# Patient Record
Sex: MALE | Race: WHITE | Employment: OTHER | URBAN - METROPOLITAN AREA
[De-identification: names, ages, dates, MRNs, and addresses within clinical notes are randomized per-mention and may not be internally consistent; named-entity substitution may affect disease eponyms.]

---

## 2018-01-22 ENCOUNTER — OFFICE VISIT CONVERTED (OUTPATIENT)
Dept: FAMILY MEDICINE CLINIC | Facility: CLINIC | Age: 68
End: 2018-01-22
Attending: NURSE PRACTITIONER

## 2018-02-21 ENCOUNTER — OFFICE VISIT CONVERTED (OUTPATIENT)
Dept: FAMILY MEDICINE CLINIC | Facility: CLINIC | Age: 68
End: 2018-02-21
Attending: NURSE PRACTITIONER

## 2018-03-01 ENCOUNTER — OFFICE VISIT CONVERTED (OUTPATIENT)
Dept: PULMONOLOGY | Facility: CLINIC | Age: 68
End: 2018-03-01
Attending: INTERNAL MEDICINE

## 2018-05-14 ENCOUNTER — CONVERSION ENCOUNTER (OUTPATIENT)
Dept: FAMILY MEDICINE CLINIC | Facility: CLINIC | Age: 68
End: 2018-05-14

## 2018-05-14 ENCOUNTER — OFFICE VISIT CONVERTED (OUTPATIENT)
Dept: FAMILY MEDICINE CLINIC | Facility: CLINIC | Age: 68
End: 2018-05-14
Attending: NURSE PRACTITIONER

## 2018-05-15 ENCOUNTER — OFFICE VISIT CONVERTED (OUTPATIENT)
Dept: PULMONOLOGY | Facility: CLINIC | Age: 68
End: 2018-05-15
Attending: INTERNAL MEDICINE

## 2018-08-15 ENCOUNTER — CONVERSION ENCOUNTER (OUTPATIENT)
Dept: FAMILY MEDICINE CLINIC | Facility: CLINIC | Age: 68
End: 2018-08-15

## 2018-08-15 ENCOUNTER — OFFICE VISIT CONVERTED (OUTPATIENT)
Dept: FAMILY MEDICINE CLINIC | Facility: CLINIC | Age: 68
End: 2018-08-15
Attending: NURSE PRACTITIONER

## 2018-11-14 ENCOUNTER — OFFICE VISIT CONVERTED (OUTPATIENT)
Dept: FAMILY MEDICINE CLINIC | Facility: CLINIC | Age: 68
End: 2018-11-14
Attending: NURSE PRACTITIONER

## 2019-03-25 ENCOUNTER — CONVERSION ENCOUNTER (OUTPATIENT)
Dept: FAMILY MEDICINE CLINIC | Facility: CLINIC | Age: 69
End: 2019-03-25

## 2019-03-25 ENCOUNTER — OFFICE VISIT CONVERTED (OUTPATIENT)
Dept: FAMILY MEDICINE CLINIC | Facility: CLINIC | Age: 69
End: 2019-03-25
Attending: NURSE PRACTITIONER

## 2019-05-14 ENCOUNTER — OFFICE VISIT CONVERTED (OUTPATIENT)
Dept: FAMILY MEDICINE CLINIC | Facility: CLINIC | Age: 69
End: 2019-05-14
Attending: NURSE PRACTITIONER

## 2019-11-08 ENCOUNTER — OFFICE VISIT CONVERTED (OUTPATIENT)
Dept: FAMILY MEDICINE CLINIC | Facility: CLINIC | Age: 69
End: 2019-11-08
Attending: NURSE PRACTITIONER

## 2020-03-02 ENCOUNTER — HOSPITAL ENCOUNTER (OUTPATIENT)
Dept: GENERAL RADIOLOGY | Facility: HOSPITAL | Age: 70
Discharge: HOME | End: 2020-03-02
Attending: NURSE PRACTITIONER

## 2020-03-02 ENCOUNTER — OFFICE VISIT CONVERTED (OUTPATIENT)
Dept: FAMILY MEDICINE CLINIC | Facility: CLINIC | Age: 70
End: 2020-03-02
Attending: NURSE PRACTITIONER

## 2020-05-13 ENCOUNTER — HOSPITAL ENCOUNTER (OUTPATIENT)
Dept: OTHER | Facility: HOSPITAL | Age: 70
Discharge: HOME | End: 2020-05-13
Attending: NURSE PRACTITIONER

## 2020-05-13 ENCOUNTER — CONVERSION ENCOUNTER (OUTPATIENT)
Dept: OTHER | Facility: HOSPITAL | Age: 70
End: 2020-05-13

## 2020-05-13 ENCOUNTER — CONVERSION ENCOUNTER (OUTPATIENT)
Dept: FAMILY MEDICINE CLINIC | Facility: CLINIC | Age: 70
End: 2020-05-13

## 2020-05-13 ENCOUNTER — OFFICE VISIT CONVERTED (OUTPATIENT)
Dept: FAMILY MEDICINE CLINIC | Facility: CLINIC | Age: 70
End: 2020-05-13
Attending: NURSE PRACTITIONER

## 2020-05-15 LAB — SARS-COV-2 RNA SPEC QL NAA+PROBE: NOT DETECTED

## 2020-05-22 ENCOUNTER — HOSPITAL ENCOUNTER (OUTPATIENT)
Dept: LAB | Facility: HOSPITAL | Age: 70
Discharge: HOME | End: 2020-05-22
Attending: NURSE PRACTITIONER

## 2020-05-22 LAB
ALBUMIN SERPL-MCNC: 4.7 G/DL (ref 3.5–5)
ALBUMIN/GLOB SERPL: 1.7 {RATIO} (ref 1.4–2.6)
ALP SERPL-CCNC: 68 U/L (ref 56–155)
ALT SERPL-CCNC: 32 U/L (ref 10–40)
ANION GAP SERPL CALC-SCNC: 21 MMOL/L (ref 8–19)
APPEARANCE UR: CLEAR
AST SERPL-CCNC: 23 U/L (ref 15–50)
BILIRUB SERPL-MCNC: 0.4 MG/DL (ref 0.2–1.3)
BILIRUB UR QL: NEGATIVE
BUN SERPL-MCNC: 28 MG/DL (ref 5–25)
BUN/CREAT SERPL: 18 {RATIO} (ref 6–20)
CALCIUM SERPL-MCNC: 9.3 MG/DL (ref 8.7–10.4)
CHLORIDE SERPL-SCNC: 101 MMOL/L (ref 99–111)
CHOLEST SERPL-MCNC: 134 MG/DL (ref 107–200)
CHOLEST/HDLC SERPL: 4.8 {RATIO} (ref 3–6)
COLOR UR: ABNORMAL
CONV CO2: 25 MMOL/L (ref 22–32)
CONV COLLECTION SOURCE (UA): ABNORMAL
CONV CREATININE URINE, RANDOM: 75.4 MG/DL (ref 10–300)
CONV MICROALBUM.,U,RANDOM: <12 MG/L (ref 0–20)
CONV TOTAL PROTEIN: 7.4 G/DL (ref 6.3–8.2)
CONV UROBILINOGEN IN URINE BY AUTOMATED TEST STRIP: 0.2 {EHRLICHU}/DL (ref 0.1–1)
CREAT UR-MCNC: 1.58 MG/DL (ref 0.7–1.2)
EST. AVERAGE GLUCOSE BLD GHB EST-MCNC: 163 MG/DL
GFR SERPLBLD BASED ON 1.73 SQ M-ARVRAT: 43 ML/MIN/{1.73_M2}
GLOBULIN UR ELPH-MCNC: 2.7 G/DL (ref 2–3.5)
GLUCOSE SERPL-MCNC: 93 MG/DL (ref 70–99)
GLUCOSE UR QL: >=1000 MG/DL
HBA1C MFR BLD: 7.3 % (ref 3.5–5.7)
HDLC SERPL-MCNC: 28 MG/DL (ref 40–60)
HGB UR QL STRIP: NEGATIVE
KETONES UR QL STRIP: NEGATIVE MG/DL
LDLC SERPL CALC-MCNC: 37 MG/DL (ref 70–100)
LEUKOCYTE ESTERASE UR QL STRIP: NEGATIVE
MICROALBUMIN/CREAT UR: 15.9 MG/G{CRE} (ref 0–25)
NITRITE UR QL STRIP: NEGATIVE
OSMOLALITY SERPL CALC.SUM OF ELEC: 299 MOSM/KG (ref 273–304)
PH UR STRIP.AUTO: 5 [PH] (ref 5–8)
POTASSIUM SERPL-SCNC: 5.4 MMOL/L (ref 3.5–5.3)
PROT UR QL: NEGATIVE MG/DL
PSA SERPL-MCNC: 2.66 NG/ML (ref 0–4)
SODIUM SERPL-SCNC: 142 MMOL/L (ref 135–147)
SP GR UR: 1.02 (ref 1–1.03)
TRIGL SERPL-MCNC: 344 MG/DL (ref 40–150)
VLDLC SERPL-MCNC: 69 MG/DL (ref 5–37)

## 2020-06-12 ENCOUNTER — HOSPITAL ENCOUNTER (OUTPATIENT)
Dept: LAB | Facility: HOSPITAL | Age: 70
Discharge: HOME | End: 2020-06-12
Attending: NURSE PRACTITIONER

## 2020-06-12 LAB
ALBUMIN SERPL-MCNC: 4.5 G/DL (ref 3.5–5)
ALBUMIN/GLOB SERPL: 1.7 {RATIO} (ref 1.4–2.6)
ALP SERPL-CCNC: 76 U/L (ref 56–155)
ALT SERPL-CCNC: 21 U/L (ref 10–40)
ANION GAP SERPL CALC-SCNC: 15 MMOL/L (ref 8–19)
AST SERPL-CCNC: 18 U/L (ref 15–50)
BILIRUB SERPL-MCNC: 0.47 MG/DL (ref 0.2–1.3)
BUN SERPL-MCNC: 32 MG/DL (ref 5–25)
BUN/CREAT SERPL: 24 {RATIO} (ref 6–20)
CALCIUM SERPL-MCNC: 8.7 MG/DL (ref 8.7–10.4)
CHLORIDE SERPL-SCNC: 104 MMOL/L (ref 99–111)
CONV CO2: 28 MMOL/L (ref 22–32)
CONV TOTAL PROTEIN: 7.1 G/DL (ref 6.3–8.2)
CREAT UR-MCNC: 1.34 MG/DL (ref 0.7–1.2)
GFR SERPLBLD BASED ON 1.73 SQ M-ARVRAT: 53 ML/MIN/{1.73_M2}
GLOBULIN UR ELPH-MCNC: 2.6 G/DL (ref 2–3.5)
GLUCOSE SERPL-MCNC: 109 MG/DL (ref 70–99)
OSMOLALITY SERPL CALC.SUM OF ELEC: 301 MOSM/KG (ref 273–304)
POTASSIUM SERPL-SCNC: 5 MMOL/L (ref 3.5–5.3)
SODIUM SERPL-SCNC: 142 MMOL/L (ref 135–147)

## 2020-07-24 ENCOUNTER — HOSPITAL ENCOUNTER (OUTPATIENT)
Dept: CT IMAGING | Facility: HOSPITAL | Age: 70
Discharge: HOME | End: 2020-07-24
Attending: PODIATRIST

## 2021-05-13 NOTE — PROGRESS NOTES
Progress Note      Patient Name: Ag Ellison   Patient ID: [id number]   Sex: Male   YOB: 1950    Primary Care Provider: Joe SHULTZ   Referring Provider: Joe SHULTZ    Visit Date: May 13, 2020    Provider: LEXII Trejo   Location: [facility]   Location Address: [address] [place] [address], [address]  [id number]   Location Phone: [phone]          Chief Complaint  · Follow up      History Of Present Illness  Ag Ellison is a 70 year old /White male who presents for evaluation and treatment of:      Here for his Medicare annual wellness and his follow-up.  He is doing well on all of his medications.  Not having any issues with his diabetes.  Was wanting to know if they allow him to travel with the band is lifted about going to [place].  With his family.  Although he is not can be going anywhere public except for at the place they are staying.    He is due to get a PSA.    COPD: He is wanting get COVID testing because he is always short of breath and he thinks it is importantly make sure that he has not had it.  He is he is afraid he has had it recently.    Hypertension doing well on his valsartan.       Past Medical History  Allergies; Cataracts, bilateral; CHF (congestive heart failure); COPD; Deafness; Heart attack; High blood pressure; High cholesterol; Shortness Of Air; Sinus trouble; Type 2 diabetes mellitus with complication         Past Surgical History  Back surgery; Cervical intervertebral disc surgery; Colonoscopy; Tonsilectomy         Medication List  Aspirin Low Dose 81 mg oral tablet,delayed release (DR/EC); Coreg 25 mg oral tablet; duloxetine 20 mg oral capsule,delayed release(DR/EC); Erectile dysfunction pump 1 pump with rings; gabapentin 300 mg oral capsule; Jardiance 10 mg oral tablet; Lantus 100 unit/mL subcutaneous solution; Lipitor 40 mg oral tablet; meloxicam 7.5 mg oral tablet; metformin 500 mg oral tablet; Novolog 100  "unit/mL subcutaneous solution; oxygen @2 liters; prednisone 10 mg oral tablet; Probiotic 15 billion cell oral capsule; Robaxin-750 750 mg oral tablet; sotalol 80 mg oral tablet; Spiriva with HandiHaler 18 mcg inhalation capsule, w/inhalation device; spironolactone 25 mg oral tablet; Stool Softener-Laxative 8.6-50 mg oral tablet; Symbicort 160-4.5 mcg/actuation inhalation HFA aerosol inhaler; tamsulosin 0.4 mg oral capsule,extended release 24hr; Tessalon Perles 100 mg oral capsule; tramadol 50 mg oral tablet; valsartan 40 mg oral tablet; Vascepa 1 gram oral capsule; Victoza 3-Dorian 0.6 mg/0.1 mL (18 mg/3 mL) subcutaneous pen injector; Vitamin C 500 mg oral tablet; Vitamin D3 2,000 unit oral capsule; Zyrtec 10 mg oral tablet         Allergy List  albuterol       Allergies Reconciled  Family Medical History  Emphysema; Heart Disease; Cancer, Unspecified; Melanoma; Diabetes         Social History  Alcohol (Current some day); Tobacco (Former)         Immunizations  Name Date Admin   Influenza    Influenza    Influenza    Prevnar 13    Tdap          Review of Systems  · Constitutional  o Denies  o : fever, fatigue, weight loss, weight gain  · Cardiovascular  o Denies  o : lower extremity edema, claudication, chest pressure, palpitations  · Respiratory  o Denies  o : shortness of breath, wheezing, cough, hemoptysis, dyspnea on exertion  · Gastrointestinal  o Denies  o : nausea, vomiting, diarrhea, constipation, abdominal pain      Vitals  Date Time BP Position Site L\R Cuff Size HR RR TEMP (F) WT  HT  BMI kg/m2 BSA m2 O2 Sat        05/13/2020 02:02 /72 Sitting    82 - R 16 97.1 231lbs 16oz 6'  2\" 29.79 2.34 95 %          Physical Examination  · Constitutional  o Appearance  o : well-nourished, well developed, alert, in no acute distress  · Eyes  o Conjunctivae  o : conjunctivae normal  o Sclerae  o : sclerae white  o Pupils and Irises  o : pupils equal, round, and reactive to light and accommodation " bilaterally  o Corneas  o : tear film normal, no lesions present  o Eyelids/Ocular Adnexae  o : eyelid appearance normal, no exudates present, eye moisture level normal  · Respiratory  o Respiratory Effort  o : breathing unlabored  o Inspection of Chest  o : normal appearance, no retractions  o Auscultation of Lungs  o : normal breath sounds throughout  · Cardiovascular  o Heart  o :   § Auscultation of Heart  § : regular rate and rhythm without murmur, PMI normal  o Peripheral Vascular System  o :   § Extremities  § : no cyanosis, clubbing or edema; less than 2 second refill noted  · Musculoskeletal  o General  o : No joint swelling or deformity noted. Muscle tone, strength and development grossly normal.  · Skin and Subcutaneous Tissue  o General Inspection  o : no rashes or lesions present, no areas of discoloration  · Neurologic  o Mental Status Examination  o : judgement, insight intact, modd and affect appropriate  o Motor Examination  o : strength grossly intact in all four extremities  o Gait and Station  o : normal gait, able to stand without difficulty          Assessment  · Screening for prostate cancer     V76.44/Z12.5  · COPD (chronic obstructive pulmonary disease)     496/J44.9  · Diabetes mellitus, type 2     250.00/E11.9  · Shortness of breath     786.05/R06.02      Plan  · Orders  o PSA Screening, Ultrasensitive, MEDICARE [facility] () - V76.44/Z12.5 - 05/13/2020  o Diabetes 2 Panel (Urine Microalbumin, CMP, Lipid, A1c, ) [facility] ([address]16, [address]388, 19177, 31788) - 250.00/E11.9 - 05/13/2020  o Urinalysis with Reflex Microscopy if abnormal ([facility]) (96301) - 250.00/E11.9 - 05/13/2020  o ACO-17: Screened for tobacco use AND received tobacco cessation intervention (4004F) - - 05/13/2020  o ACO-39: Current medications updated and reviewed () - - 05/13/2020  o ACO-15: Pneumococcal Vaccine Administered or Previously Received (4040F) - - 05/13/2020  o ACO-14: Influenza immunization administered or previously  received () - - 05/13/2020  o ACO-19: Colorectal cancer screening results documented and reviewed (3017F) - - 05/13/2020  o [facility] NCOV2 (send-out) (62447) - 496/J44.9, 786.05/R06.02 - 05/13/2020  · Medications  o Zithromax Z-Dorian 250 mg oral tablet   SIG: take 2 tablets (500 mg) by oral route once daily for 1 day then 1 tablet (250 mg) by oral route once daily for 4 days   DISP: (6) tablets with 0 refills  Discontinued on 05/13/2020     o Medications have been Reconciled  o Transition of Care or Provider Policy  · Instructions  o Patient was educated/instructed on their diagnosis, treatment and medications prior to discharge from the clinic today.  o Minutes spent with patient including greater than 50% in Education/Counseling/Care Coordination.  o Time spent with the patient was minutes, more than 50% face to face.  · Disposition  o Return in 6 months            Electronically Signed by: LEXII Trejo -Author on May 13, 2020 02:52:40 PM

## 2021-05-13 NOTE — PROGRESS NOTES
Progress Note      Patient Name: Ag Ellison   Patient ID: [id number]   Sex: Male   YOB: 1950    Primary Care Provider: Joe SHULTZ   Referring Provider: Joe SHULTZ    Visit Date: May 13, 2020    Provider: LEXII Trejo   Location: [facility]   Location Address: [address] [place] [address], [address]  [id number]   Location Phone: [phone]          Chief Complaint  · Annual Wellness Exam      History Of Present Illness  Ag Ellison is a 70 year old /White male who presents for evaluation and treatment of:   The patient is a 70 year old /White male who has come to this office for his Annual Wellness Visit.   His Primary Care Provider is Joe SHULTZ. His comprehensive Care Team list, including suppliers, has been updated on the Facesheet. His medical/family history, height, weight, BMI, and blood pressure have been reviewed and are in the chart. The Health Risk Assessment has been completed and scanned in the chart.   Medications are listed in the medication list.   The active problem list includes: Allergies, Cataracts, bilateral, CHF (congestive heart failure), COPD, Deafness, High blood pressure, High cholesterol, Shortness Of Air, Sinus trouble, and DM2   The patient does not have a history of substance use.   Patient reports his diet is adequate.   The Mini-Cog has been administered and is scanned in chart. The results are negative. His cognitive function is without limitation.   A hearing loss screen was completed today and the result is negative.   Patient does not have any risk factors for depression. Patient completed the PHQ-9 today and it has been scanned in the chart. The total score is 1-4.   The Timed Up and Go screen was administered today and the result is negative.   The [place] Index of [place] in ADLs indicated full function (score of 6).   A Falls Risk Assessment has been completed, including a review of  home fall hazards and medication review.   Overall, the patient's functional ability is noted by this provider to be within normal limits. His level of safety is noted to be within normal limits. His balance/gait is within normal limits. There have been no falls in the past year. Patient-specific home safety recommendations have been reviewed and a copy has been given to patient.   He denies issues with leaking urine.   There are no additional risk factors identified.   Living Will/Advanced Directive has not previously been completed.   Personalized health advice was given to the patient and a written health screening schedule was established; see Plan for details.       Past Medical History  Disease Name Date Onset Notes   Allergies --  --    Cataracts, bilateral --  --    CHF (congestive heart failure) --  --    COPD --  --    Deafness --  --    Heart attack --  --    High blood pressure --  --    High cholesterol --  --    Shortness Of Air --  --    Sinus trouble --  --    Type 2 diabetes mellitus with complication --  --          Past Surgical History  Procedure Name Date Notes   Back surgery --  --    Cervical intervertebral disc surgery --  --    Colonoscopy 5/9/19 pt scheduled with surgeon   Tonsilectomy --  --          Medication List  Name Date Started Instructions   Aspirin Low Dose 81 mg oral tablet,delayed release (DR/EC)  take 1 tablet (81 mg) by oral route once daily for 90 days   Coreg 25 mg oral tablet  take 1 tablet (25 mg) by oral route 2 times per day with food for 30 days   duloxetine 20 mg oral capsule,delayed release(DR/EC)  take 1 capsule (20 mg) by oral route 2 times per day for 30 days   Erectile dysfunction pump 1 pump with rings 05/14/2019 1 pump with rings to treat ED   gabapentin 300 mg oral capsule  take 1 capsule by oral route once a day (at bedtime) for 90 days   Jardiance 10 mg oral tablet  take 1 tablet (10 mg) by oral route once daily in the morning   Lantus 100 unit/mL  subcutaneous solution  inject by subcutaneous route 48 units qam, 68 units qpm   Lipitor 40 mg oral tablet  take 1 tablet (40 mg) by oral route once daily for 90 days   meloxicam 7.5 mg oral tablet 05/14/2019 take 1 tablet (7.5 mg) by oral route once daily for 30 days for 90 days   metformin 500 mg oral tablet  take 3 tablets by oral route once a day (in the evening) for 30 days   Novolog 100 unit/mL subcutaneous solution 05/22/2017 inject by subcutaneous route per prescriber's instructions. Insulin dosing requires individualization. for 90 days   oxygen @2 liters 01/22/2018 Has resting o2 sats on room air of 87% in office today   prednisone 10 mg oral tablet 03/26/2019 Take 6 on day 1, 5 on day 2, 4 on day 3, 3 on day 4, 2 on day 5, and 1 on day 6   Probiotic 15 billion cell oral capsule  take 1 capsule by oral route daily   Robaxin-750 750 mg oral tablet 07/20/2018 take 2 tablets (1,500 mg) by oral route 3 times per day for 30 days   sotalol 80 mg oral tablet  take 1 tablet (80 mg) by oral route 2 times per day for 90 days   Spiriva with HandiHaler 18 mcg inhalation capsule, w/inhalation device  inhale 1 capsule (18 mcg) by inhalation route once daily for 90 days   spironolactone 25 mg oral tablet  take 1 tablet (25 mg) by oral route once daily for 90 days   Stool Softener-Laxative 8.6-50 mg oral tablet  take 2 tablets by oral route once daily for 30 days   Symbicort 160-4.5 mcg/actuation inhalation HFA aerosol inhaler 03/10/2020 inhale 2 puffs by inhalation route 2 times per day in the morning and evening   tamsulosin 0.4 mg oral capsule,extended release 24hr  take 1 capsule (0.4 mg) by oral route once daily 1/2 hour following the same meal each day for 30 days   Tessalon Perles 100 mg oral capsule 02/24/2020 take 1 capsule (100 mg) by oral route 3 times per day as needed for cough for 21 days   tramadol 50 mg oral tablet 10/31/2016 take 1 tablet (50 mg) by oral route every 6 hours as needed for 90 days    valsartan 40 mg oral tablet  take 1 tablet (40 mg) by oral route 2 times per day for 30 days   Vascepa 1 gram oral capsule  take 1 capsule by oral route 2 times a day   Victoza 3-Dorian 0.6 mg/0.1 mL (18 mg/3 mL) subcutaneous pen injector  inject 1.2 mg by subcutaneous route once daily for 30 days   Vitamin C 500 mg oral tablet  take 1 tablet by oral route 2 times a day for 30 days   Vitamin D3 2,000 unit oral capsule  take 1 capsule by oral route 2 times a day for 30 days   Zyrtec 10 mg oral tablet  take 1 tablet (10 mg) by oral route once daily for 30 days         Allergy List  Allergen Name Date Reaction Notes   albuterol --  hives --          Family Medical History  Disease Name Relative/Age Notes   Emphysema  --    Heart Disease Father/57   --    Cancer, Unspecified Brother/  Sister/   --    Melanoma  --    Diabetes Mother/   --          Social History  Finding Status Start/Stop Quantity Notes   Alcohol Current some day --/-- --  --    Tobacco Former --/-- 2 1/2 pack quit in 2004 used for 47 years         Immunizations  NameDate Admin Mfg Trade Name Lot Number Route Inj VIS Given VIS Publication   Fopskmnrl54/23/2019 [facility] FLUZONE-HIGH DOSE [id number] IM LD 10/23/2019    Comments: pt tolerated injection well   Curobiuwb90/01/2018 [facility] Fluarix, quadrivalent, preservative free [id number] NE NE 05/14/2019    Comments:    Lzkpwsmeb50/31/2016 [facility] Fluarix, quadrivalent, preservative free 359mh  RD 10/31/2016 07/02/2012   Comments: pt tolerated inj well   Prevnar 1305/02/2016 NE PREVNAR 13 [id number]  RD 05/02/2016    Comments:    Tdap05/02/2016 [facility] BOOSTRIX [id number] IM LD 05/02/2016 01/24/2012   Comments:          Review of Systems  · Constitutional  o Denies  o : fatigue, night sweats  · Eyes  o Denies  o : double vision, blurred vision  · HENT  o Denies  o : vertigo, recent head injury  · Breasts  o Denies  o : abnormal changes in breast size, additional breast symptoms except as noted in the HPI  · Cardiovascular  o Denies  o :  "chest pain, irregular heart beats  · Respiratory  o Denies  o : shortness of breath, productive cough  · Gastrointestinal  o Denies  o : nausea, vomiting  · Genitourinary  o Denies  o : dysuria, urinary retention  · Integument  o Denies  o : hair growth change, new skin lesions  · Neurologic  o Denies  o : altered mental status, seizures  · Musculoskeletal  o Denies  o : joint swelling, limitation of motion  · Endocrine  o Denies  o : cold intolerance, heat intolerance  · Heme-Lymph  o Denies  o : petechiae, lymph node enlargement or tenderness  · Allergic-Immunologic  o Denies  o : frequent illnesses      Vitals  Date Time BP Position Site L\R Cuff Size HR RR TEMP (F) WT  HT  BMI kg/m2 BSA m2 O2 Sat HC       05/13/2020 02:02 /72 Sitting    82 - R 16 97.1 231lbs 16oz 6'  2\" 29.79 2.34 95 %          Physical Examination  · Eyes  o Conjunctivae  o : conjunctivae normal  o Sclerae  o : sclerae white  o Pupils and Irises  o : pupils equal, round, and reactive to light and accommodation bilaterally  o Corneas  o : tear film normal, no lesions present  o Eyelids/Ocular Adnexae  o : eyelid appearance normal, no exudates present, eye moisture level normal  · Ears, Nose, Mouth and Throat  o Ears  o : external ear auricle normal, otic canal normal, TM with no reddness, effusion, retraction  o Nose  o : external normal, nasal mucosa normal, turbinates normal  o Oral Cavity  o : tongue no lesion, oral mucosa normal  o Throat  o : no erythemia, exudate or lesions  · Neck  o Inspection/Palpation  o : normal appearance, no masses or tenderness, trachea midline, no enlarged cervical or supraclavicular lymphnodes palpated  o Thyroid  o : gland size normal, nontender, no nodules or masses present on palpation, thyroid motion normal during swallowing  · Respiratory  o Respiratory Effort  o : breathing unlabored  o Inspection of Chest  o : normal appearance, no retractions  o Auscultation of Lungs  o : normal breath sounds " throughout  · Cardiovascular  o Heart  o :   § Auscultation of Heart  § : regular rate and rhythm without murmur, PMI normal  o Peripheral Vascular System  o :   § Carotid Arteries  § : normal pulses bilaterally, no bruits present  § Pedal Pulses  § : pulses 2 bilaterally  § Extremities  § : no cyanosis, clubbing or edema; less than 2 second refill noted  · Musculoskeletal  o General  o : No joint swelling or deformity noted. Muscle tone, strength and development grossly normal.  · Skin and Subcutaneous Tissue  o General Inspection  o : no rashes or lesions present, no areas of discoloration  · Neurologic  o Mental Status Examination  o : judgement, insight intact, modd and affect appropriate  o Motor Examination  o : strength grossly intact in all four extremities  o Gait and Station  o : normal gait, able to stand without difficulty          Assessment  · Screening for alcoholism     V79.1/Z13.89  · Screening for depression     V79.0/Z13.89  · Need for hepatitis C screening test     V73.89/Z11.59  · Screening for colon cancer     V76.51/Z12.11  · Screening for prostate cancer     V76.44/Z12.5  · Diabetes mellitus, type 2     250.00/E11.9  · Tobacco abuse counseling       Tobacco abuse counseling     V65.42/Z71.6  · Encounter for Medicare annual wellness exam     V70.0/Z00.00       PSA was done with lab orders from his regular visit.       Plan  · Orders  o Annual alcohol screening using the AUDIT-C tool, 15 minutes [facility] (18848, ) - V79.1/Z13.89 - 05/13/2020  o Negative alcohol screening () - V79.1/Z13.89 - 05/13/2020  o Annual depression screening using the PHQ-9 tool, 15 minutes (, 48756) - V79.0/Z13.89 - 05/13/2020  o ACO-18: Negative screen for clinical depression using a standardized tool () - V79.0/Z13.89 - 05/13/2020  o PSA Screening, Ultrasensitive, MEDICARE [facility] () - V76.44/Z12.5 - 05/13/2020  o Smoking cessation counseling, 3-10 minutes [facility] ([address]1609) - V65.42/Z71.6 -  05/13/2020  o ACO-17: Screened for tobacco use AND received tobacco cessation intervention (4004F) - V65.42/Z71.6 - 05/13/2020  o Falls Risk Assessment Completed (3288F) - V70.0/Z00.00 - 05/13/2020  o Brief hearing screening (written) [facility] () - V70.0/Z00.00 - 05/13/2020  o Annual wellness visit; includes a personalized prevention plan of service (pps), initial visit () - V70.0/Z00.00 - 05/13/2020  o ACO-13: Fall Risk Screening with no falls in past year or only one fall without injury in the past year (1101F) - V70.0/Z00.00 - 05/13/2020  o Presence or absence of urinary incontinence assessed (ANIKET) (1090F) - V70.0/Z00.00 - 05/13/2020  o ACO-17: Screened for tobacco use AND received tobacco cessation intervention (4004F) - V65.42/Z71.6 - 05/13/2020  o ACO-39: Current medications updated and reviewed () - - 05/13/2020  o ACO-18: Negative screen for clinical depression using a standardized tool () - V79.0/Z13.89 - 05/13/2020  o ACO-15: Pneumococcal Vaccine Administered or Previously Received (4040F) - - 05/13/2020  o ACO-14: Influenza immunization administered or previously received () - - 05/13/2020  o ACO-19: Colorectal cancer screening results documented and reviewed (3017F) - V76.51/Z12.11 - 05/13/2020   Scheduled 6/2020  o ACO-13: Fall Risk Screening with no falls in past year or only one fall without injury in the past year (1101F) - - 05/13/2020  · Medications  o Medications have been Reconciled  o Transition of Care or Provider Policy  · Instructions  o Audit-C Questionnaire completed and scanned into the EMR under the designated folder within the patient's documents.  o Audit-C score of 0-4 - Negative Screen - Brief Discussion  o Depression Screen completed and scanned into the EMR under the designated folder within the patient's documents.  o Today's PHQ-9 result is 0  o Medicare suggests a once in a lifetime screening for Hepatitis C for all Medicare beneficiaries born between  4908-1465.  o Tobacco and smoking cessation counseling for more than 3 minutes was completed.  o Health Risk Assessment has been reviewed with the patient.  o Written health screening schedule for next 5-10 years was established with patient; information scanned in chart and given/mailed to patient.  o Fall prevention methods discussed and a copy of recommendations given/mailed to patient.  o Patient was educated/instructed on their diagnosis, treatment and medications prior to discharge from the clinic today.  o Minutes spent with patient including greater than 50% in Education/Counseling/Care Coordination.  o Time spent with the patient was minutes, more than 50% face to face.  · Disposition  o Return in 6 months            Electronically Signed by: LEXII Trejo -Author on May 13, 2020 02:54:06 PM

## 2021-05-15 VITALS
DIASTOLIC BLOOD PRESSURE: 63 MMHG | HEIGHT: 74 IN | BODY MASS INDEX: 30.42 KG/M2 | WEIGHT: 237 LBS | SYSTOLIC BLOOD PRESSURE: 88 MMHG | OXYGEN SATURATION: 88 % | RESPIRATION RATE: 16 BRPM | HEART RATE: 83 BPM | TEMPERATURE: 97.4 F

## 2021-05-15 VITALS
OXYGEN SATURATION: 95 % | TEMPERATURE: 98.4 F | HEART RATE: 82 BPM | TEMPERATURE: 97.1 F | HEIGHT: 74 IN | DIASTOLIC BLOOD PRESSURE: 72 MMHG | WEIGHT: 232 LBS | BODY MASS INDEX: 29.77 KG/M2 | RESPIRATION RATE: 16 BRPM | SYSTOLIC BLOOD PRESSURE: 112 MMHG

## 2021-05-15 VITALS
HEART RATE: 84 BPM | HEIGHT: 74 IN | DIASTOLIC BLOOD PRESSURE: 56 MMHG | WEIGHT: 235 LBS | RESPIRATION RATE: 16 BRPM | SYSTOLIC BLOOD PRESSURE: 90 MMHG | BODY MASS INDEX: 30.16 KG/M2 | OXYGEN SATURATION: 87 % | TEMPERATURE: 96.9 F

## 2021-05-15 VITALS
TEMPERATURE: 98.5 F | HEART RATE: 81 BPM | OXYGEN SATURATION: 85 % | SYSTOLIC BLOOD PRESSURE: 91 MMHG | HEIGHT: 74 IN | DIASTOLIC BLOOD PRESSURE: 46 MMHG | RESPIRATION RATE: 16 BRPM

## 2021-05-15 VITALS
BODY MASS INDEX: 30.54 KG/M2 | SYSTOLIC BLOOD PRESSURE: 95 MMHG | WEIGHT: 238 LBS | HEIGHT: 74 IN | RESPIRATION RATE: 16 BRPM | DIASTOLIC BLOOD PRESSURE: 53 MMHG | OXYGEN SATURATION: 88 % | HEART RATE: 88 BPM | TEMPERATURE: 97.2 F

## 2021-05-16 VITALS
TEMPERATURE: 96.9 F | OXYGEN SATURATION: 87 % | WEIGHT: 243 LBS | BODY MASS INDEX: 31.18 KG/M2 | SYSTOLIC BLOOD PRESSURE: 103 MMHG | HEIGHT: 74 IN | DIASTOLIC BLOOD PRESSURE: 62 MMHG | RESPIRATION RATE: 16 BRPM | HEART RATE: 83 BPM

## 2021-05-16 VITALS
SYSTOLIC BLOOD PRESSURE: 116 MMHG | OXYGEN SATURATION: 90 % | TEMPERATURE: 97 F | HEIGHT: 74 IN | RESPIRATION RATE: 16 BRPM | WEIGHT: 254 LBS | DIASTOLIC BLOOD PRESSURE: 62 MMHG | HEART RATE: 75 BPM | BODY MASS INDEX: 32.6 KG/M2

## 2021-05-16 VITALS
BODY MASS INDEX: 32.85 KG/M2 | TEMPERATURE: 96.6 F | WEIGHT: 256 LBS | OXYGEN SATURATION: 90 % | SYSTOLIC BLOOD PRESSURE: 101 MMHG | HEART RATE: 76 BPM | RESPIRATION RATE: 16 BRPM | HEIGHT: 74 IN | DIASTOLIC BLOOD PRESSURE: 55 MMHG

## 2021-05-16 VITALS
RESPIRATION RATE: 14 BRPM | OXYGEN SATURATION: 89 % | SYSTOLIC BLOOD PRESSURE: 109 MMHG | HEART RATE: 80 BPM | DIASTOLIC BLOOD PRESSURE: 58 MMHG | BODY MASS INDEX: 31.7 KG/M2 | HEIGHT: 74 IN | TEMPERATURE: 96.2 F | WEIGHT: 247 LBS

## 2021-05-16 VITALS
OXYGEN SATURATION: 92 % | WEIGHT: 242 LBS | SYSTOLIC BLOOD PRESSURE: 85 MMHG | BODY MASS INDEX: 31.06 KG/M2 | HEIGHT: 74 IN | TEMPERATURE: 96.8 F | RESPIRATION RATE: 16 BRPM | DIASTOLIC BLOOD PRESSURE: 31 MMHG | HEART RATE: 80 BPM

## 2021-05-28 VITALS
HEIGHT: 75 IN | OXYGEN SATURATION: 85 % | DIASTOLIC BLOOD PRESSURE: 58 MMHG | BODY MASS INDEX: 30.63 KG/M2 | OXYGEN SATURATION: 82 % | RESPIRATION RATE: 16 BRPM | SYSTOLIC BLOOD PRESSURE: 122 MMHG | TEMPERATURE: 98.1 F | TEMPERATURE: 98.2 F | HEART RATE: 79 BPM | SYSTOLIC BLOOD PRESSURE: 94 MMHG | HEIGHT: 75 IN | DIASTOLIC BLOOD PRESSURE: 62 MMHG | RESPIRATION RATE: 16 BRPM | HEART RATE: 89 BPM | BODY MASS INDEX: 32.35 KG/M2 | WEIGHT: 260.19 LBS | WEIGHT: 246.37 LBS

## 2021-05-28 NOTE — PROGRESS NOTES
Patient: OLENA HARRIS     Acct: [id number]     Report: #[id number]  UNIT #: [id number]     : 1950    Encounter Date:05/15/2018  PRIMARY CARE:   ***Signed***  --------------------------------------------------------------------------------------------------------------------  Chief Complaint      Encounter Date      May 15, 2018            Primary Care Provider      NILS BRENNAN            Referring Provider      NILS BRENNAN            Patient Complaint      Patient is complaining of       2 Month Follow Up/Chest Ct/Pft Results            VITALS      Height 6 ft 3 in / 190.5 cm      Weight 246 lbs 6 oz / 111.391384 kg      BSA 2.46 m2      BMI 30.8 kg/m2      Temperature 98.1 F / 36.72 C - Oral      Pulse 89      Respirations 16      Blood Pressure 94/58 Sitting, Right Arm      Pulse Oximetry 85%, room air      Exhaled Nitrous Oxide Testin            HPI      The patient is a 68 year old male with a 85 pack year smoking history who     presents for follow up regarding dyspnea and hypoxia.  He has supplemental     oxygen at home, but does not wear it consistently as he does not feel short of     breath even through he checks his oxygen at times and it will be 83-87%.  He is     followed with the [facility] in [place], [place] and came to me at his     last visit for a second opinion.  Since that time he has undergone pulmonary     function testing and CT scan of the chest without contrast and returns today to     follow up on these results.  PFTs were significant for very severe obstruction     to airflow with an FEV1 of 31% of predicted. His DLCO was also 45% of     predicted.  He was saturating 85% on room air in my office today.  He has 2-3     liters that he wears at home and also has sleep apnea and he has been on pap     therapy for the past three years.  In regards to his inhaler regimen, he uses     Symbicort 160/4.5 two puffs twice a daily and Spiriva Respimat 2.5  two puffs     inhaled once daily.  He uses these consistently.  He and his wife recently     traveled to [place] and he did not need oxygen in the flight despite being at an     increased elevation/altitude.  He has some chronic cough and clearing of his     throat, worse in the morning. He has chronic wheezing and shortness of breath     with activities, but he is able to complete all of his ADLs.            Review of systems as noted.            Past medical, family, social and surgical history updated in the chart,     unchanged since last visit.              Medications reviewed by myself and updated in the chart.            ROS      Constitutional:  Complains of: Fatigue, Denies: Fever, Weight gain, Weight loss    , Chills, Insomnia, Other      Respiratory/Breathing:  Complains of: Shortness of air, Wheezing, Cough, Denies    : Hemoptysis, Pleuritic pain, Other      Endocrine:  Denies: Polydipsia, Polyuria, Heat/cold intolerance, Diabetes, Other      Eyes:  Denies: Blurred vision, Vision Changes, Other      Ears, nose, mouth, throat:  Denies: Congestion, Dysphagia, Hearing Changes,     Nose Bleeding, Nasal Discharge, Throat pain, Tinnitus, Other      Cardiovascular:  Denies: Chest Pain, Exertional dyspnea, Peripheral Edema,     Palpitations, Syncope, Wake up Gasping for air, Orthopnea, Tachycardia, Other      Gastrointestinal:  Denies: Abdominal pain/cramping, Bloody stools, Constipation    , Diarrhea, Melena, Nausea, Vomiting, Other      Genitourinary:  Denies: Dysuria, Urinary frequency, Incontinence, Hematuria,     Urgency, Other      Musculoskeletal:  Denies: Joint Pain, Joint Stiffness, Joint Swelling, Myalgias    , Other      Hematologic/lymphatic:  DENIES: Lymphadenopathy, Bruising, Bleeding tendencies,     Other      Neurologic:  Denies: Headache, Numbness, Weakness, Seizures, Other      Psychiatric:  Denies: Anxiety, Appropriate Effect, Depression, Other      Sleep:  No: Excessive daytime sleep, Morning  Headache?, Snoring, Insomnia?,     Stop breathing at sleep?, Other      Integumentary:  Denies: Rash, Dry skin, Skin Warm to Touch, Other            FAMILY/SOCIAL/MEDICAL HX      Surgical History:  Yes: Oral Surgery (toncil), Orthopedic Surgery (left knee,    back surg ruptured disk ,neck), No: AAA Repair, Abdominal Surgery, Angioplasty,     Appendectomy, Back Surgery, Bladder Surgery, Bowel Surgery, Breast Surgery, CABG    , Carotid Stenosis, Cholecystectomy, Ear Surgery, Eye Surgery, Head Surgery,     Hernia Surgery, Kidney Surgery, Nose Surgery, Prostatectomy, Rectal Surgery,     Spinal Surgery, Testicular Surgery, Throat Surgery, Valve Replacement, Vascular     Surgery, Other Surgeries      Heart - Family Hx:  Father      Diabetes - Family Hx:  Mother      Is Father Still Living?:  No      Is Mother Still Living?:  No       Family History:  Yes      Social History:  Tobacco Use, No Alcohol Use, No Recreational Drug use      Smoking status:  Former smoker (2 to 3 ppd for 40 )      Anticoagulation Therapy:  No      Antibiotic Prophylaxis:  No      Medical History:  Yes: Arthritis, Chronic Bronchitis/COPD, Diabetes, High Blood     Pressure (med controlled), High Cholesterol, Miscellaneous Medical/oth (pace     maker and difibulator ), No: Alcoholism, Allergies, Anemia, Asthma, Blood     Disease, Broken Bones, Cataracts, Chemical Dependency, Chemotherapy/Cancer,     Emphysema, Chronic Liver Disease, Colon Trouble, Colitis, Diverticulitis,     Congestive Heart Failu, Deafness or Ringing Ears, Convulsions, Depression,     Anxiety, Bipolar Disorder, Epilepsy, Seizures, Forgetfullness, Glaucoma, Gall     Stones, Gout, Head Injury, Heart Attack, Heart Murmur, Hemorrhoids/Rectal Prob,     Hepatitis, Hiatal Hernia, HIV (Do not ask - volu, Jaundice, Kidney or Bladder     Disease, Kidney Stones, Migrane Headaches, Mitral Valve Prolapse, Night sweats,     Phlebitis, Psychiatric Care, Reflux Disease, Rheumatic Fever, Sexually      Transmitted Dis, Shortness Of Breath, Sinus Trouble, Skin Disease/Psoriais/Ecz,     Stroke, Thyroid Problem, Tuberculosis or Pos TB Te      Psychiatric History      none            PREVENTION      Hx Influenza Vaccination:  Yes      Date Influenza Vaccine Given:  Dec 1, 2017      Influenza Vaccine Declined:  No      2 or More Falls Past Year?:  No      Fall Past Year with Injury?:  No      Hx Pneumococcal Vaccination:  Yes      Encouraged to follow-up with:  PCP regarding preventative exams.      Chart initiated by      Rose Capellan ma            ALLERGIES/MEDICATIONS      Allergies:        Coded Allergies:             ALBUTEROL (Verified  Allergy, Unknown, rash , 5/15/18)       breaks out      Medications    Last Reconciled on 5/15/18 16:37 by DRAGAN FERNANDEZ,       Liraglutide (Victoza Inj) 0.1 Ml Cartridge      0.6 MG SQ QDAY, EA         Reported         5/15/18       Cholecalciferol (Vitamin D3*) 1,000 Unit Tab      1000 UNITS PO BID, #60 TAB 0 Refills         Reported         3/1/18       Cetirizine Hcl (ZyrTEC*) 10 Mg Tablet      10 MG PO QDAY, #30 TAB 0 Refills         Reported         3/1/18       Valsartan (Diovan) 40 Mg Tablet      40 MG PO QDAY, TAB         Reported         3/1/18       traMADol (Ultram) 50 Mg Tablet      50 MG PO Q6HR, TAB 0 Refills         Reported         3/1/18       Tamsulosin HCL (Tamsulosin*) 0.4 Mg Cap.er.24h      0.4 MG PO QDAY, #30 CAP 0 Refills         Reported         3/1/18       (stool softner) Unknown Strength  No Conflict Check               Reported         3/1/18       Spironolactone (Spironolactone*) 25 Mg Tablet      25 MG PO QDAY, #30 TAB 0 Refills         Reported         3/1/18       Tiotropium (Spiriva) 18 Mcg Inh      1 PUFFS INH RTQDAY, #1 INH 0 Refills         Reported         3/1/18       Sotalol HCl (Sotalol HCl) 80 Mg Tablet      80 MG PO Q8, TAB         Reported         3/1/18       Magnesium Amino Acid Chelate (Magnesium*) 100 Mg Tablet      400 MG PO  QDAY, TAB         Reported         3/1/18       Insulin Glargine (Lantus VIAL) 100 Units/Ml Vial      48 UNITS SUBQ 68 pm, #1 VIAL 0 Refills         Reported         3/1/18       Insulin Human Aspart (NovoLOG VIAL) 100 Unit/1 Ml Vial      12 UNITS SUBQ BID INSULIN, #1 VIAL 0 Refills         Reported         3/1/18       Gabapentin (Gabapentin) 300 Mg Capsule      300 MG PO 2 am and 2 pm, #60 CAP 0 Refills         Reported         3/1/18       Furosemide* (Lasix*) 40 Mg Tablet      40 MG PO QDAY, #30 TAB 0 Refills         Reported         3/1/18       DULoxetine (Cymbalta) 20 Mg Capsule.dr      20 MG PO QDAY, #30 CAP 0 Refills         Reported         3/1/18       Cyclobenzaprine Hcl (Cyclobenzaprine*) 5 Mg Tablet      5 MG PO QDAY, TAB         Reported         3/1/18       Carvedilol (Coreg) 12.5 Mg Tablet      12.5 MG PO BID, #60 TAB 0 Refills         Reported         3/1/18       Budesonide/Formoterol Fumarate (Symbicort 160/4.5 Mcg) 10.2 Gm Inh      2 PUFF INH RTBID, #1 INH 0 Refills         Reported         3/1/18       Atorvastatin (Atorvastatin) 20 Mg Tablet      20 MG PO HS, #30 TAB 0 Refills         Reported         3/1/18       Aspirin (Aspirin*) 81 Mg Tab.chew      81 MG PO QDAY, #30 TAB.CHEW 0 Refills         Reported         3/1/18      Current Medications      Current Medications Reviewed 5/15/18            EXAM      VITAL SIGNS:  Reviewed.  85% on room air today.        GENERAL: Morbidly obese male, breathing comfortably on 2 liters nasal cannula,     in no acute distress.        NECK:  Supple without tracheal deviation or lymphadenopathy.  No thyromegaly     appreciated.      LYMPHATICS:  No cervical or supraclavicular lymphadenopathy.      HEENT: Pupils are equal, round and reactive to light. There is no scleral     icterus.  Nares patent without hypertrophy of the turbinates. No erythema of     the passages.  TMs are clear bilaterally with good cone of light. The posterior     pharynx is without   lesions or erythema.      RESPIRATORY: He had poor aeration throughout.        CARDIOVASCULAR:  Regular rate and rhythm.  No murmurs, gallops or rubs.  No     lower extremity edema.  Equal radial pulses.        GI: Obese, nontender to palpation.        MUSCULOSKELETAL:  No joint effusions, erythema or warmth over the major joint     systems.      SKIN:  No rashes or lesions.      NEUROLOGIC: Cranial nerves II-XII are intact bilaterally.  Moves all     extremities. Ambulates with ease.      PSYCH:  Appropriate mood and affect.      Vitals      Vitals:             Height 6 ft 3 in / 190.5 cm           Weight 246 lbs 6 oz / 111.318584 kg           BSA 2.46 m2           BMI 30.8 kg/m2           Temperature 98.1 F / 36.72 C - Oral           Pulse 89           Respirations 16           Blood Pressure 94/58 Sitting, Right Arm           Pulse Oximetry 85%, room air            REVIEW      Results Reviewed      PCCS Results Reviewed?:  Yes Prev Lab Results, Yes Prev Radiology Results, Yes     Previous Mecial Records      Lab Results      Reviewed my last clinic note.   Reviewed alpha 1 antitrypsin testing, MF     genotype.      Radiographic Results               [facility]                PACS RADIOLOGY REPORT            Patient: OLENA HARRIS   Acct: #[id number]   Report: #[id number]            UNIT #: [id number]    DOS: 18 1240      INSURANCE:[employer] MEDICARE [facility]   ORDER #:[id number]      LOCATION:CT     : 1950            PROVIDERS      ADMITTING:     ATTENDING: DRAGAN FERNANDEZ      FAMILY:  NILS BRENNAN [facility]   ORDERING:  DRAGAN FERNANDEZ         OTHER:    DICTATING:  JENN ACEVEDO MD            REQ #:[id number]   EXAM:[facility] - CT CHEST without CONTRAST      REASON FOR EXAM:  COPD      REASON FOR VISIT:  COPD            *******Signed******         PROCEDURE:   CT CHEST WITHOUT CONTRAST             COMPARISON:   [facility], CR, CHEST PA/AP    2018, 10:09.             INDICATIONS:   COPD/SOA             TECHNIQUE:   CT images were created without the administration of contrast     material.               PROTOCOL:     Standard imaging protocol performed                RADIATION:     DLP: 796mGy*cm          Automated exposure control was utilized to minimize radiation dose.              FINDINGS:         There are no suspicious pulmonary nodules or masses.  There is no airspace     disease to indicate       pneumonia.  A few scattered linear densities are seen consistent with minor     scarring or       subsegmental atelectasis.  There are no definite enlarged hilar or mediastinal     lymph nodes.  The       study is limited by noncontrast technique.  There are atherosclerotic     calcifications including       involvement of the coronary arteries.  The patient has a left-sided transvenous     pacemaker in place.        There are no acute findings seen beneath the hemidiaphragms.  There are no     suspicious osteolytic       or sclerotic lesions within the bony thorax.             CONCLUSION:         1. No suspicious pulmonary nodules or masses.  There is no airspace disease to     indicate pneumonia.      2. Minor scarring versus subsegmental atelectasis.              JENN ACEVEDO MD             Electronically Signed and Approved By: JENN ACEVEDO MD on 3/16/2018 at 14:18                        Until signed, this is an unconfirmed preliminary report that may contain      errors and is subject to change.                                              WORBR:      D:03/16/18 1418      PFT Results      [id number]  [id number]                                 [facility]                          [employer] Management Services                            [place], [place]  [address]               __________________________________________________________________________             Patient Name:                   Attending  Physician:      Ag Ellison M.D.             Patient Visit # MR #            Admit Date  Disch Date     Location      [id number]    [id number]      03/13/2018                 [facility]             Date of Birth      1950      __________________________________________________________________________      821 - DIAGNOSTIC REPORT             PULMONARY FUNCTION TEST             SPIROMETRY:      FEV1/FVC ratio is 63% of predicted.      FEV1 is 31% of predicted, 1.25 L.      FVC is 49% of predicted, 2.68 L.      There was no significant one-time response to bronchodilator administration.             LUNG VOLUMES:      Total lung capacity is 106% of predicted, 8.59 L.      Residual volume is 200% of predicted, 5.45 L.             DIFFUSION:      DLCO is 45% of predicted.      DLCO/VA is 75% of predicted.             FLOW VOLUME LOOP:      Flow volume loops show severe obstruction to airflow.             CONCLUSION:      1.  Abnormal spirometry with severe airway obstruction.  There was no one          time response to bronchodilator administration.      2.  Lung volumes show airtrapping.      3.  Diffusion capacity is moderately reduced and corrects to mild when          corrected for lung volumes.             Correlate clinically.             To be electronically signed in [employer]      [id number] DRAGAN FERNANDEZ M.D.             KM:sabi      D:  03/28/2018 09:06      T:  03/28/2018 09:54      #[id number]             Until signed, this is an unconfirmed preliminary report that may contain      errors and is subject to change.                   03/28/18 1256  <Electronically signed by DRAGAN FERNANDEZ DO>            Assessment      Notes      New Medications      * Liraglutide (Victoza Inj) 0.1 ML CARTRIDGE: 0.6 MG SQ QDAY      ASSESSMENT:   This is a pleasant 68 year old gentleman with an 85 pack year     smoking history and chronic hypoxemic respiratory failure who presents for     follow up.       1. Chronic hypoxemic respiratory failure on long term oxygen therapy.      2.  COPD, unspecified severity, likely severe based on his triple inhaler     therapy regimen.      3.  Obstructive sleep apnea on noninvasive positive pressure ventilator at     night.      4.  Morbid obesity, BMI 32.5 kg/m2.      5.  History of MI, status post cardiac defibrillator pacemaker placement.        6. 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              PLAN:      1.  Continue Symbicort 160/4.5 and Spiriva Respimat 2.5 two puffs inhaled once     daily.      2.  I spent approximately 5 minutes today explaining to the patient that based     on his symptoms, hypoxemia and PFTs that he would be a candidate for evaluation     for lung transplantation. He is willing to be referred to a lung transplant     center for workup and possible listing, but he is [facility]     [facility] as he is a [place] and 100% service connected. We     will be happy to refer him to another facility should this not [facility]     [facility].  He will come back in three months to follow up.  Continue current     regimen.        3. I have counseled him on using his oxygen 24/7, he verbalized understanding.       4. He is up-to-date on all of his vaccinations.      5. FENO was 50 today in our office, but he is already on an inhaled steroid at     the max dose.                 Disclaimer: Converted document may not contain table formatting or lab diagrams. Please see [employer] for the authenticated document.

## 2021-05-28 NOTE — PROGRESS NOTES
Patient: OLENA HARRIS     Acct: [id number]     Report: #[id number]  UNIT #: [id number]     : 1950    Encounter Date:2018  PRIMARY CARE:   ***Signed***  --------------------------------------------------------------------------------------------------------------------  Chief Complaint      Encounter Date      Mar 1, 2018            Patient Complaint      Patient is complaining of       Hypoxia/ 2ND Opinion On O2 Refer Joe SHIELDS      Height 6 ft 3 in / 190.5 cm      Weight 260 lbs 3 oz / 118.471925 kg      BSA 2.53 m2      BMI 32.5 kg/m2      Temperature 98.2 F / 36.78 C - Oral      Pulse 79      Respirations 16      Blood Pressure 122/62 Sitting, Left Arm      Pulse Oximetry 82%, roomair@rest      Exhaled Nitrous Oxide Testin            HPI      The patient is a 68 year old  who presents for a second opinion     regarding portable oxygen.  He is seen with [facility] in [place], [place]     and diagnosed with COPD, chronic hypoxemic respiratory failure, and has a     history of an MI and arrhythmias and is status post defibrillator and pacemaker     placement. He is on Symbicort 160/4.5 two puffs twice daily as well as Spiriva     HandiHaler used once daily.  He does not need a lot of albuterol inhaler     therapy. He enjoys golfing and traveling with his wife.  He does get short of     breath with exertion, especially when he goes up and down stairs and has to     take some time to recovery. He was recently diagnosed with pneumonia by his PCP     and treated and his symptoms of cough resolved. He gets approximately two     episodes of pneumonia per year and one URI requiring antibiotics as an     outpatient.  He is compliant with CPAP and has sleep apnea. He is a former     smoker, smoked 2-2.5 packs per year x37 years.            ROS      Constitutional:  Denies: Fatigue, Fever, Weight gain, Weight loss, Chills,     Insomnia, Other       Respiratory/Breathing:  Complains of: Shortness of air, Wheezing, Cough, Denies    : Hemoptysis, Pleuritic pain, Other      Endocrine:  Denies: Polydipsia, Polyuria, Heat/cold intolerance, Diabetes, Other      Eyes:  Denies: Blurred vision, Vision Changes, Other      Ears, nose, mouth, throat:  Denies: Mouth lesions, Thrush, Throat pain,     Hoarseness, Allergies/Hay Fever, Post Nasal Drip, Headaches, Recent Head Injury    , Nose Bleeding, Neck Stiffness, Thyroid Mass, Hearing Loss, Ear Fullness, Dry     Mouth, Nasal or Sinus Pain, Dry Lips, Nasal discharge, Nasal congestion, Other      Cardiovascular:  Denies: Palpitations, Syncope, Claudication, Chest Pain, Wake     up Gasping for air, Leg Swelling, Irregular Heart Rate, Cyanosis, Dyspnea on     Exertion, Other      Gastrointestinal:  Denies: Nausea, Constipation, Diarrhea, Abdominal pain,     Vomiting, Difficulty Swallowing, Reflux/Heartburn, Dysphagia, Jaundice, Bloating    , Melena, Bloody stools, Other      Genitourinary:  Denies: Urinary frequency, Incontinence, Hematuria, Urgency,     Nocturia, Dysuria, Testicular problems, Other      Musculoskeletal:  Denies: Joint Pain, Joint Stiffness, Joint Swelling, Myalgias    , Other      Hematologic/lymphatic:  DENIES: Lymphadenopathy, Bruising, Bleeding tendencies,     Other      Neurological:  Denies: Headache, Numbness, Weakness, Seizures, Other      Psychiatric:  Denies: Anxiety, Appropriate Effect, Depression, Other      Sleep:  No: Excessive daytime sleep, Morning Headache?, Snoring, Insomnia?,     Stop breathing at sleep?, Other      Integumentary:  Denies: Rash, Dry skin, Skin Warm to Touch, Other      Immunologic/Allergic:  Denies: Latex allergy, Seasonal allergies, Asthma,     Urticaria, Eczema, Other      Immunization status:  No: Up to date            FAMILY/SOCIAL/MEDICAL HX      Surgical History:  Yes: Oral Surgery (toncil), Orthopedic Surgery (left knee,    back surg ruptured disk ,neck)      Heart -  Family Hx:  Father      Diabetes - Family Hx:  Mother      Is Father Still Living?:  No      Is Mother Still Living?:  No      Social History:  Tobacco Use      Smoking status:  Former smoker (2 to 3 ppd for 40 )      Anticoagulation Therapy:  No      Antibiotic Prophylaxis:  No      Medical History:  Yes: Arthritis, Chronic Bronchitis/COPD, Diabetes, High Blood     Pressure (med controlled), High Cholesterol, Miscellaneous Medical/oth (pace     maker and difibulator )      Psychiatric History      None            Hx Influenza Vaccination:  Yes      Influenza Vaccine Declined:  No      2 or More Falls Past Year?:  No      Fall Past Year with Injury?:  No      Hx Pneumococcal Vaccination:  Yes      Encouraged to follow-up with:  PCP regarding preventative exams.      Chart initiated by      ruth gutierres ma            ALLERGIES/MEDICATIONS      Allergies:        Coded Allergies:             Albuterol (Verified  Allergy, rash , 3/1/18)       breaks out      Medications    Last Reconciled on 3/1/18 16:46 by DRAGAN FERNANDEZ DO      Cholecalciferol (Vitamin D3*) 1,000 Unit Tab      1000 UNITS PO BID, #60 TAB 0 Refills         Reported         3/1/18       Cetirizine Hcl (ZyrTEC*) 10 Mg Tablet      10 MG PO QDAY, #30 TAB 0 Refills         Reported         3/1/18       Valsartan (Diovan) 40 Mg Tablet      40 MG PO QDAY, TAB         Reported         3/1/18       traMADol (Ultram) 50 Mg Tablet      50 MG PO Q6HR, TAB 0 Refills         Reported         3/1/18       Tamsulosin HCL (Tamsulosin*) 0.4 Mg Cap.er.24h      0.4 MG PO QDAY, #30 CAP 0 Refills         Reported         3/1/18       (stool softner) Unknown Strength  No Conflict Check               Reported         3/1/18       Spironolactone (Spironolactone*) 25 Mg Tablet      25 MG PO QDAY, #30 TAB 0 Refills         Reported         3/1/18       Tiotropium (Spiriva) 18 Mcg Inh      1 PUFFS INH RTQDAY, #1 INH 0 Refills         Reported         3/1/18       Sotalol HCl  (Sotalol HCl) 80 Mg Tablet      80 MG PO Q8, TAB         Reported         3/1/18       Magnesium Amino Acid Chelate (Magnesium*) 100 Mg Tablet      400 MG PO QDAY, TAB         Reported         3/1/18       Insulin Glargine (Lantus VIAL) 100 Units/Ml Vial      48 UNITS SUBQ 68 pm, #1 VIAL 0 Refills         Reported         3/1/18       Insulin Human Aspart (NovoLOG VIAL) 100 Unit/1 Ml Vial      12 UNITS SUBQ BID INSULIN, #1 VIAL 0 Refills         Reported         3/1/18       Gabapentin (Gabapentin) 300 Mg Capsule      300 MG PO 2 am and 2 pm, #60 CAP 0 Refills         Reported         3/1/18       Furosemide* (Lasix*) 40 Mg Tablet      40 MG PO QDAY, #30 TAB 0 Refills         Reported         3/1/18       DULoxetine (Cymbalta) 20 Mg Capsule.dr      20 MG PO QDAY, #30 CAP 0 Refills         Reported         3/1/18       Cyclobenzaprine Hcl (Cyclobenzaprine*) 5 Mg Tablet      5 MG PO QDAY, TAB         Reported         3/1/18       Carvedilol (Coreg) 12.5 Mg Tablet      12.5 MG PO BID, #60 TAB 0 Refills         Reported         3/1/18       Budesonide/Formoterol Fumarate (Symbicort 160/4.5 Mcg) 10.2 Gm Inh      2 PUFF INH RTBID, #1 INH 0 Refills         Reported         3/1/18       Atorvastatin (Atorvastatin) 20 Mg Tablet      20 MG PO HS, #30 TAB 0 Refills         Reported         3/1/18       Aspirin (Aspirin*) 81 Mg Tab.chew      81 MG PO QDAY, #30 TAB.CHEW 0 Refills         Reported         3/1/18      Current Medications      Current Medications Reviewed 3/1/18            EXAM      VITAL SIGNS:  Reviewed.  82% on room air at rest, was placed on 2 liters nasal     cannula and his oxygen came up to 92%.        GENERAL: Morbidly obese male, breathing comfortably on 2 liters nasal cannula,     in no acute distress.        NECK:  Supple without tracheal deviation or lymphadenopathy.  No thyromegaly     appreciated.      LYMPHATICS:  No cervical or supraclavicular lymphadenopathy.      HEENT: Pupils are equal, round and  reactive to light. There is no scleral     icterus.  Nares patent without hypertrophy of the turbinates. No erythema of     the passages.  TMs are clear bilaterally with good cone of light. The posterior     pharynx is without  lesions or erythema.      RESPIRATORY: There are bibasilar crackles. Tympanic to percussion. No wheezing     or rhonchi. Poor aeration throughout with a prolonged expiratory phase.        CARDIOVASCULAR:  Regular rate and rhythm.  No murmurs, gallops or rubs.  No     lower extremity edema.  Equal radial pulses.        GI: Obese, nontender to palpation.        MUSCULOSKELETAL:  No joint effusions, erythema or warmth over the major joint     systems.      SKIN:  No rashes or lesions.      NEUROLOGIC: Cranial nerves II-XII are intact bilaterally.  Moves all     extremities. Ambulates with ease.      PSYCH:  Appropriate mood and affect.      Vtials      Vitals:             Height 6 ft 3 in / 190.5 cm           Weight 260 lbs 3 oz / 118.584097 kg           BSA 2.53 m2           BMI 32.5 kg/m2           Temperature 98.2 F / 36.78 C - Oral           Pulse 79           Respirations 16           Blood Pressure 122/62 Sitting, Left Arm           Pulse Oximetry 82%, roomair@rest            REVIEW      Results Reviewed      PCCS Results Reviewed?:  Yes Prev Lab Results, Yes Prev Radiology Results, Yes     Previous [facility] Records      Lab Results      There are no images, pulmonary function tests or echocardiograms for my review     today.            PLAN      Assessment      COPD (chronic obstructive pulmonary disease) - J44.9            Respiratory failure - J96.90            Notes      New Medications      * Aspirin (Aspirin*) 81 MG TAB.CHEW: 81 MG PO QDAY #30      * Atorvastatin 20 MG TABLET: 20 MG PO HS #30      * Budesonide/Formoterol Fumarate (Symbicort 160/4.5 Mcg) 10.2 GM INH: 2 PUFF     INH RTBID #1      * Carvedilol (Coreg) 12.5 MG TABLET: 12.5 MG PO BID #60      * CYCLOBENZAPRINE HCL  (Cyclobenzaprine*) 5 MG TABLET: 5 MG PO QDAY      * DULoxetine (Cymbalta) 20 MG CAPSULE.DR: 20 MG PO QDAY #30      * Furosemide* (Lasix*) 40 MG TABLET: 40 MG PO QDAY #30      * Gabapentin 300 MG CAPSULE: 300 MG PO 2 am and 2 pm #60      * Insulin Human Aspart (NovoLOG VIAL) 100 UNIT/1 ML VIAL: 12 UNITS SUBQ BID     INSULIN #1      * INSULIN GLARGINE (Lantus VIAL) 100 UNITS/ML VIAL: 48 UNITS SUBQ 68 pm #1      * Magnesium Amino Acid Chelate (Magnesium*) 100 MG TABLET: 400 MG PO QDAY      * Sotalol HCl 80 MG TABLET: 80 MG PO Q8      * TIOTROPIUM (Spiriva) 18 MCG INH: 1 PUFFS INH RTQDAY #1      * Spironolactone (Spironolactone*) 25 MG TABLET: 25 MG PO QDAY #30      * (stool softner) Unknown Strength:       * Tamsulosin HCL (Tamsulosin*) 0.4 MG CAP.ER.24H: 0.4 MG PO QDAY #30      * traMADol (Ultram) 50 MG TABLET: 50 MG PO Q6HR      * Valsartan (Diovan) 40 MG TABLET: 40 MG PO QDAY      * Cetirizine Hcl (ZyrTEC*) 10 MG TABLET: 10 MG PO QDAY #30      * Cholecalciferol (Vitamin D3*) 1,000 UNIT TAB: 1,000 UNITS PO BID #60      New Diagnostics      * PFT-Comp, PrePost,DLCO,BodyBox, Week       Dx: COPD (chronic obstructive pulmonary disease) - J44.9      * Chest W/O Cont CT, SCHEDULED PROCEDURE       Dx: COPD (chronic obstructive pulmonary disease) - J44.9      New Office Procedures      * Follow Up Appointment, Month      ASSESSMENT:       1. Chronic hypoxemic respiratory failure on long term oxygen therapy.      2.  COPD, unspecified severity, likely severe based on his triple inhaler     therapy regimen.      3.  Obstructive sleep apnea on noninvasive positive pressure ventilator at     night.      4.  Morbid obesity, BMI 32.5 kg/m2.      5.  History of MI, status post cardiac defibrillator pacemaker placement.        6. Crackles on exam.      7.  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              PLAN:      1.  I have ordered full PFTs and have also ordered a CT of the chest w/o      contrast to better elucidate the parenchymal findings. he does have abnormal     lung exam today.  [facility] including [facility]     most [facility], their clinic notes and PFTs for comparison as well     as any imaging reports he may have.        2. He is on appropriate inhalers and is using them correctly.  We will attempt     to set him up with a [employer] company that would allow him to have a portable oxygen     tank that he can easily take with him when he travels as this is his enjoyment     with his wife.      3. Follow up in one month to go over the above studies.        4. I counseled the patient on weight loss and he verbalized understanding that     this would help his overall health.      5. I congratulated him on smoking cessation.            Patient Education      Education resources provided:  Yes      Patient Education Provided:  COPD, How to use an Inhaler            Patient Education:        Chronic Obstructive Pulmonary Disease                 Disclaimer: Converted document may not contain table formatting or lab diagrams. Please see [facility] for the authenticated document.

## 2021-07-19 ENCOUNTER — OFFICE VISIT (OUTPATIENT)
Dept: FAMILY MEDICINE CLINIC | Facility: CLINIC | Age: 71
End: 2021-07-19

## 2021-07-19 VITALS
HEIGHT: 73 IN | OXYGEN SATURATION: 90 % | BODY MASS INDEX: 31.41 KG/M2 | HEART RATE: 88 BPM | DIASTOLIC BLOOD PRESSURE: 62 MMHG | TEMPERATURE: 96.6 F | WEIGHT: 237 LBS | SYSTOLIC BLOOD PRESSURE: 132 MMHG | RESPIRATION RATE: 16 BRPM

## 2021-07-19 DIAGNOSIS — J44.9 CHRONIC OBSTRUCTIVE PULMONARY DISEASE, UNSPECIFIED COPD TYPE (HCC): ICD-10-CM

## 2021-07-19 DIAGNOSIS — I50.22 CHRONIC SYSTOLIC (CONGESTIVE) HEART FAILURE (HCC): ICD-10-CM

## 2021-07-19 DIAGNOSIS — E11.42 DIABETIC PERIPHERAL NEUROPATHY ASSOCIATED WITH TYPE 2 DIABETES MELLITUS (HCC): ICD-10-CM

## 2021-07-19 DIAGNOSIS — I73.9 PERIPHERAL VASCULAR DISEASE, UNSPECIFIED (HCC): ICD-10-CM

## 2021-07-19 PROBLEM — E66.9 OBESITY: Status: ACTIVE | Noted: 2021-07-19

## 2021-07-19 PROBLEM — H91.90 DEAFNESS: Status: ACTIVE | Noted: 2021-07-19

## 2021-07-19 PROBLEM — J01.80 OTHER ACUTE SINUSITIS: Status: ACTIVE | Noted: 2021-07-19

## 2021-07-19 PROBLEM — R06.02 SHORTNESS OF BREATH: Status: ACTIVE | Noted: 2021-07-19

## 2021-07-19 PROBLEM — I21.9 HEART ATTACK (HCC): Status: ACTIVE | Noted: 2021-07-19

## 2021-07-19 PROBLEM — I50.9 CHF (CONGESTIVE HEART FAILURE) (HCC): Status: ACTIVE | Noted: 2021-07-19

## 2021-07-19 PROBLEM — I10 HYPERTENSION: Status: ACTIVE | Noted: 2021-02-04

## 2021-07-19 PROBLEM — E78.00 HIGH CHOLESTEROL: Status: ACTIVE | Noted: 2021-02-04

## 2021-07-19 PROBLEM — G47.33 OBSTRUCTIVE SLEEP APNEA: Status: ACTIVE | Noted: 2021-02-04

## 2021-07-19 PROBLEM — J34.9 SINUS TROUBLE: Status: ACTIVE | Noted: 2021-07-19

## 2021-07-19 PROBLEM — E11.9 TYPE 2 DIABETES MELLITUS WITHOUT COMPLICATION (HCC): Status: ACTIVE | Noted: 2021-02-04

## 2021-07-19 PROCEDURE — 96160 PT-FOCUSED HLTH RISK ASSMT: CPT | Performed by: NURSE PRACTITIONER

## 2021-07-19 PROCEDURE — 1170F FXNL STATUS ASSESSED: CPT | Performed by: NURSE PRACTITIONER

## 2021-07-19 PROCEDURE — G0439 PPPS, SUBSEQ VISIT: HCPCS | Performed by: NURSE PRACTITIONER

## 2021-07-19 PROCEDURE — 1160F RVW MEDS BY RX/DR IN RCRD: CPT | Performed by: NURSE PRACTITIONER

## 2021-07-19 PROCEDURE — 1126F AMNT PAIN NOTED NONE PRSNT: CPT | Performed by: NURSE PRACTITIONER

## 2021-07-19 RX ORDER — BUDESONIDE AND FORMOTEROL FUMARATE DIHYDRATE 160; 4.5 UG/1; UG/1
2 AEROSOL RESPIRATORY (INHALATION)
COMMUNITY

## 2021-07-19 RX ORDER — ASCORBIC ACID 500 MG
TABLET ORAL
COMMUNITY

## 2021-07-19 RX ORDER — DULOXETIN HYDROCHLORIDE 20 MG/1
CAPSULE, DELAYED RELEASE ORAL
COMMUNITY

## 2021-07-19 RX ORDER — SOTALOL HYDROCHLORIDE 80 MG/1
TABLET ORAL
COMMUNITY

## 2021-07-19 RX ORDER — CARVEDILOL 6.25 MG/1
TABLET ORAL
COMMUNITY

## 2021-07-19 RX ORDER — TRAMADOL HYDROCHLORIDE 50 MG/1
TABLET ORAL
COMMUNITY

## 2021-07-19 RX ORDER — TAMSULOSIN HYDROCHLORIDE 0.4 MG/1
CAPSULE ORAL
COMMUNITY

## 2021-07-19 RX ORDER — VALSARTAN 40 MG/1
80 TABLET ORAL
COMMUNITY

## 2021-07-19 RX ORDER — FUROSEMIDE 40 MG/1
TABLET ORAL
COMMUNITY

## 2021-07-19 RX ORDER — GABAPENTIN 300 MG/1
CAPSULE ORAL
COMMUNITY

## 2021-07-19 RX ORDER — ICOSAPENT ETHYL 1000 MG/1
CAPSULE ORAL
COMMUNITY

## 2021-07-19 RX ORDER — LIRAGLUTIDE 6 MG/ML
1.2 INJECTION SUBCUTANEOUS
COMMUNITY

## 2021-07-19 RX ORDER — MELOXICAM 15 MG/1
15 TABLET ORAL DAILY
COMMUNITY

## 2021-07-19 RX ORDER — ASPIRIN 81 MG/1
TABLET ORAL
COMMUNITY

## 2021-07-19 NOTE — PROGRESS NOTES
The ABCs of the Annual Wellness Visit  Subsequent Medicare Wellness Visit    Chief Complaint   Patient presents with   • Medicare Wellness-subsequent       Subjective   History of Present Illness:  Ag Ellison is a 71 y.o. male who presents for a Subsequent Medicare Wellness Visit.    DOPD, CHF and PVD [facility].  Diabetes:  Well controlled currently    HEALTH RISK ASSESSMENT    Recent Hospitalizations:  No hospitalization(s) within the last year.    Current Medical Providers:  Patient Care Team:  Joe Brantley APRN as PCP - General (Nurse Practitioner)    Smoking Status:  Social History     Tobacco Use   Smoking Status Former Smoker   • Packs/day: 2.50   • Types: Cigarettes   Smokeless Tobacco Never Used   Tobacco Comment    QUIT 2004       Alcohol Consumption:  Social History     Substance and Sexual Activity   Alcohol Use Yes    Comment: CURRENT SOME DAY       Depression Screen:   PHQ-2/PHQ-9 Depression Screening 7/19/2021   Little interest or pleasure in doing things 0   Feeling down, depressed, or hopeless 0   Total Score 0       Fall Risk Screen:  NOHEMI Fall Risk Assessment was completed, and patient is at LOW risk for falls.Assessment completed on:7/19/2021    Health Habits and Functional and Cognitive Screening:  Functional & Cognitive Status 7/19/2021   Do you have difficulty preparing food and eating? No   Do you have difficulty bathing yourself, getting dressed or grooming yourself? No   Do you have difficulty using the toilet? No   Do you have difficulty moving around from place to place? No   Do you have trouble with steps or getting out of a bed or a chair? No   Current Diet Well Balanced Diet   Dental Exam Up to date   Eye Exam Up to date   Exercise (times per week) 6 times per week   Current Exercises Include Walking   Do you need help using the phone?  No   Are you deaf or do you have serious difficulty hearing?  No   Do you need help with transportation? No   Do you need help  shopping? No   Do you need help preparing meals?  No   Do you need help with housework?  No   Do you need help with laundry? No   Do you need help taking your medications? No   Do you need help managing money? No   Do you ever drive or ride in a car without wearing a seat belt? Yes   Have you felt unusual stress, anger or loneliness in the last month? No   Who do you live with? Spouse   If you need help, do you have trouble finding someone available to you? No   Have you been bothered in the last four weeks by sexual problems? No   Do you have difficulty concentrating, remembering or making decisions? No         Does the patient have evidence of cognitive impairment? No    Asprin use counseling:Taking ASA appropriately as indicated    Age-appropriate Screening Schedule:  Refer to the list below for future screening recommendations based on patient's age, sex and/or medical conditions. Orders for these recommended tests are listed in the plan section. The patient has been provided with a written plan.    Health Maintenance   Topic Date Due   • ZOSTER VACCINE (1 of 2) Never done   • URINE MICROALBUMIN  05/22/2021   • DIABETIC FOOT EXAM  07/13/2021   • DIABETIC EYE EXAM  07/13/2021   • HEMOGLOBIN A1C  07/29/2021   • INFLUENZA VACCINE  08/01/2021   • LIPID PANEL  01/29/2022   • TDAP/TD VACCINES (2 - Td or Tdap) 05/02/2026          The following portions of the patient's history were reviewed and updated as appropriate: allergies, current medications, past family history, past medical history, past social history, past surgical history and problem list.    Outpatient Medications Prior to Visit   Medication Sig Dispense Refill   • ascorbic acid (VITAMIN C) 500 MG tablet Vitamin C 500 mg oral tablet take 1 tablet by oral route 2 times a day for 30 days   Active     • aspirin (aspirin) 81 MG EC tablet Aspirin Low Dose 81 mg oral tablet,delayed release (DR/EC) take 1 tablet (81 mg) by oral route once daily for 90 days    Active     • budesonide-formoterol (SYMBICORT) 160-4.5 MCG/ACT inhaler Inhale 2 puffs 2 (Two) Times a Day.     • carvedilol (COREG) 6.25 MG tablet Take  by mouth.     • Cholecalciferol 25 MCG (1000 UT) tablet dispersible Take  by mouth.     • DULoxetine (CYMBALTA) 20 MG capsule duloxetine 20 mg oral capsule,delayed release(DR/EC) take 1 capsule (20 mg) by oral route 2 times per day for 30 days   Active     • empagliflozin (Jardiance) 10 MG tablet tablet Jardiance 10 mg oral tablet take 1 tablet (10 mg) by oral route once daily in the morning   Active     • furosemide (LASIX) 40 MG tablet furosemide 40 mg oral tablet take 1 tablet (40 mg) by oral route once daily for 90 days   Suspended     • gabapentin (NEURONTIN) 300 MG capsule gabapentin 300 mg oral capsule take 1 capsule by oral route once a day (at bedtime) for 90 days   Active     • icosapent ethyl (Vascepa) 1 g capsule capsule Vascepa 1 gram oral capsule take 1 capsule by oral route 2 times a day   Active     • insulin aspart (novoLOG FLEXPEN) 100 UNIT/ML solution pen-injector sc pen Inject  under the skin into the appropriate area as directed 3 (Three) Times a Day With Meals.     • Insulin Glargine (LANTUS SOLOSTAR) 100 UNIT/ML injection pen Inject  under the skin into the appropriate area as directed.     • Liraglutide (Victoza) 18 MG/3ML solution pen-injector injection 1.2 mg.     • meloxicam (MOBIC) 15 MG tablet Take 15 mg by mouth Daily.     • metFORMIN (GLUCOPHAGE) 500 MG tablet metformin 500 mg oral tablet take 3 tablets by oral route once a day (in the evening) for 30 days   Active     • sotalol (BETAPACE) 80 MG tablet sotalol 80 mg oral tablet take 1 tablet (80 mg) by oral route 2 times per day for 90 days   Active     • tamsulosin (FLOMAX) 0.4 MG capsule 24 hr capsule tamsulosin 0.4 mg oral capsule,extended release 24hr take 1 capsule (0.4 mg) by oral route once daily 1/2 hour following the same meal each day for 30 days   Active     • traMADol  "(ULTRAM) 50 MG tablet Take  by mouth.     • valsartan (DIOVAN) 40 MG tablet 80 mg.       No facility-administered medications prior to visit.       Patient Active Problem List   Diagnosis   • CHF (congestive heart failure) (CMS/[facility])   • Chronic obstructive pulmonary disease (CMS/[facility])   • Chronic systolic congestive heart failure (CMS/[facility])   • Deafness   • Diabetic peripheral neuropathy associated with type 2 diabetes mellitus (CMS/[facility])   • Heart attack (CMS/[facility])   • Hypertension   • High cholesterol   • Obesity   • Obstructive sleep apnea   • Other acute sinusitis   • Shortness of breath   • Sinus trouble   • Type 2 diabetes mellitus without complication (CMS/[facility])       Advanced Care Planning:  ACP discussion was held with the patient during this visit. Patient has an advance directive in EMR which is still valid.     Review of Systems    Compared to one year ago, the patient feels his physical health is better.  Compared to one year ago, the patient feels his mental health is better.    Reviewed chart for potential of high risk medication in the elderly: yes  Reviewed chart for potential of harmful drug interactions in the elderly:yes    Objective         Vitals:    07/19/21 1414   BP: 132/62   BP Location: Right arm   Patient Position: Sitting   Cuff Size: Large Adult   Pulse: 88   Resp: 16   Temp: 96.6 °F (35.9 °C)   TempSrc: Infrared   SpO2: 90%   Weight: 108 kg (237 lb)   Height: 185.4 cm (73\")   PainSc: 0-No pain       Body mass index is 31.27 kg/m².  Discussed the patient's BMI with him. The BMI is below average; BMI management plan is completed.    Physical Exam          Assessment/Plan   Medicare Risks and Personalized Health Plan  CMS Preventative Services Quick Reference  Obesity/Overweight   Sexually Transmitted Infection (STI) Exposure Risk    The above risks/problems have been discussed with the patient.  Pertinent information has been shared with the patient in the After Visit Summary.  Follow up " plans and orders are seen below in the Assessment/Plan Section.    Diagnoses and all orders for this visit:    1. Chronic obstructive pulmonary disease, unspecified COPD type (CMS/HCC)    2. Chronic systolic (congestive) heart failure (CMS/HCC)    3. Diabetic peripheral neuropathy associated with type 2 diabetes mellitus (CMS/HCC)    4. Peripheral vascular disease, unspecified (CMS/HCC)      Follow Up:  Return in about 6 months (around 1/19/2022) for Next scheduled follow up.     An After Visit Summary and PPPS were given to the patient.

## 2021-08-09 ENCOUNTER — TELEPHONE (OUTPATIENT)
Dept: PHARMACY | Facility: HOSPITAL | Age: 71
End: 2021-08-09

## 2021-08-09 NOTE — TELEPHONE ENCOUNTER
Medication Adherence Call    Patient was called today to discuss medication adherence with Victoza, as he was identified as having care opportunities.     he did not answer. I will try again at a later date.    Emely De León PharmD  Population Health Pharmacist  08/09/21    Medication Adherence Call    Patient was called today to discuss medication adherence with Victoza, as he was identified as having care opportunities.    he denies issues with adherence and denies any barriers to receiving medications.    Emely De León PharmD  Population Health Pharmacist  08/10/21

## 2021-11-10 DIAGNOSIS — R05.9 COUGH: Primary | ICD-10-CM

## 2021-11-10 RX ORDER — BENZONATATE 100 MG/1
100 CAPSULE ORAL 3 TIMES DAILY PRN
Qty: 30 CAPSULE | Refills: 1 | Status: SHIPPED | OUTPATIENT
Start: 2021-11-10

## 2021-11-10 RX ORDER — CEFDINIR 300 MG/1
300 CAPSULE ORAL 2 TIMES DAILY
Qty: 20 CAPSULE | Refills: 0 | Status: SHIPPED | OUTPATIENT
Start: 2021-11-10 | End: 2021-11-22

## 2021-11-10 RX ORDER — AZITHROMYCIN 250 MG/1
TABLET, FILM COATED ORAL
Qty: 6 TABLET | Refills: 0 | Status: CANCELLED | OUTPATIENT
Start: 2021-11-10

## 2021-11-10 NOTE — TELEPHONE ENCOUNTER
Pt stated he is having cold symptoms and sore throat, he would like a Z-pack and tessalon pearls, pended orders.

## 2021-11-20 DIAGNOSIS — R05.9 COUGH: ICD-10-CM

## 2021-11-22 RX ORDER — CEFDINIR 300 MG/1
CAPSULE ORAL
Qty: 20 CAPSULE | Refills: 0 | Status: SHIPPED | OUTPATIENT
Start: 2021-11-22

## 2021-11-22 NOTE — TELEPHONE ENCOUNTER
Please advise medication   Last visit: 7/19/2021  Next visit: 1/21/2022    Last refilled on 11/10/2021

## 2024-01-23 ENCOUNTER — TRANSCRIBE ORDERS (OUTPATIENT)
Dept: ADMINISTRATIVE | Age: 74
End: 2024-01-23

## 2024-01-23 DIAGNOSIS — C20 RECTAL CANCER (HCC): Primary | ICD-10-CM

## 2024-01-25 ENCOUNTER — HOSPITAL ENCOUNTER (OUTPATIENT)
Dept: MRI IMAGING | Age: 74
Discharge: HOME | End: 2024-01-25
Payer: MEDICARE

## 2024-01-25 DIAGNOSIS — C20 RECTAL CANCER (HCC): ICD-10-CM

## 2024-01-25 PROCEDURE — 6360000004 HC RX CONTRAST MEDICATION: Performed by: SURGERY

## 2024-01-25 PROCEDURE — A9579 GAD-BASE MR CONTRAST NOS,1ML: HCPCS | Performed by: SURGERY

## 2024-01-25 PROCEDURE — 72197 MRI PELVIS W/O & W/DYE: CPT

## 2024-01-25 RX ADMIN — GADOTERIDOL 14 ML: 279.3 INJECTION, SOLUTION INTRAVENOUS at 09:40
